# Patient Record
Sex: FEMALE | Race: WHITE
[De-identification: names, ages, dates, MRNs, and addresses within clinical notes are randomized per-mention and may not be internally consistent; named-entity substitution may affect disease eponyms.]

---
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
## 2018-05-18 ENCOUNTER — HOSPITAL ENCOUNTER (OUTPATIENT)
Dept: HOSPITAL 45 - C.LAB1850 | Age: 18
Discharge: HOME | End: 2018-05-18
Attending: INTERNAL MEDICINE
Payer: COMMERCIAL

## 2018-05-18 DIAGNOSIS — E03.9: Primary | ICD-10-CM

## 2023-04-26 ENCOUNTER — OFFICE VISIT (OUTPATIENT)
Dept: PRIMARY CARE | Facility: CLINIC | Age: 23
End: 2023-04-26
Payer: COMMERCIAL

## 2023-04-26 VITALS
HEART RATE: 83 BPM | BODY MASS INDEX: 35.51 KG/M2 | HEIGHT: 62 IN | OXYGEN SATURATION: 97 % | DIASTOLIC BLOOD PRESSURE: 84 MMHG | SYSTOLIC BLOOD PRESSURE: 131 MMHG | WEIGHT: 193 LBS

## 2023-04-26 DIAGNOSIS — Z00.00 WELL ADULT EXAM: Primary | ICD-10-CM

## 2023-04-26 DIAGNOSIS — F41.9 ANXIETY: ICD-10-CM

## 2023-04-26 DIAGNOSIS — E66.9 OBESITY (BMI 35.0-39.9 WITHOUT COMORBIDITY): ICD-10-CM

## 2023-04-26 PROBLEM — S62.655A CLOSED NONDISPLACED FRACTURE OF MIDDLE PHALANX OF LEFT RING FINGER: Status: RESOLVED | Noted: 2023-04-26 | Resolved: 2023-04-26

## 2023-04-26 PROCEDURE — 99385 PREV VISIT NEW AGE 18-39: CPT | Performed by: FAMILY MEDICINE

## 2023-04-26 PROCEDURE — 1036F TOBACCO NON-USER: CPT | Performed by: FAMILY MEDICINE

## 2023-04-26 RX ORDER — FLUOXETINE HYDROCHLORIDE 40 MG/1
1 CAPSULE ORAL DAILY
COMMUNITY
Start: 2023-02-04 | End: 2023-04-26 | Stop reason: SDUPTHER

## 2023-04-26 RX ORDER — FLUOXETINE HYDROCHLORIDE 40 MG/1
40 CAPSULE ORAL DAILY
Qty: 90 CAPSULE | Refills: 2 | Status: SHIPPED | OUTPATIENT
Start: 2023-04-26 | End: 2024-02-02

## 2023-04-26 ASSESSMENT — PATIENT HEALTH QUESTIONNAIRE - PHQ9
SUM OF ALL RESPONSES TO PHQ9 QUESTIONS 1 AND 2: 1
10. IF YOU CHECKED OFF ANY PROBLEMS, HOW DIFFICULT HAVE THESE PROBLEMS MADE IT FOR YOU TO DO YOUR WORK, TAKE CARE OF THINGS AT HOME, OR GET ALONG WITH OTHER PEOPLE: NOT DIFFICULT AT ALL
2. FEELING DOWN, DEPRESSED OR HOPELESS: NOT AT ALL
1. LITTLE INTEREST OR PLEASURE IN DOING THINGS: SEVERAL DAYS

## 2023-04-26 NOTE — PROGRESS NOTES
"  Subjective   Patient ID: Kayley Bender is a 22 y.o. adult who presents for Establish Care (New patient with anxiety, needs fluoxetine refill).  She moved here from [place] for a job a little over a year ago.  Her family is still in [place]    Past Medical, Surgical, Social and Family Hx reviewed-Yes    Any acute complaints?   No    Any chronic issues addressed today or Rx refills needed?    Anxiety feels well controlled on current prozac.  She wants to restart talk therapy.      Last pap has never had one done here immunizations  Labs unsure  Up to date on immunizations yes, TDaP age 17  Dentist in the past year has not found one yet    Menstruation no concerns  Sexual Activity no, never.  Unsure about sexual identity    Current health habits \"could be better.\"  She tries to eat healthy, drinks plenty of water.  No exercise.  She gets plenty of sleep but it could be better.    Hobbies: drawing, reading, D&D both local and online    PE:  /84   Pulse 83   Ht 1.562 m (5' 1.5\")   Wt 87.5 kg (193 lb)   LMP 03/26/2023   SpO2 97%   BMI 35.88 kg/m²   Alert adult woman, NAD  HEENT clear  Neck supple, No LAD  Heart RRR no murmur  Lungs CTA bilat  Abdomen benign, normal BS, soft NT/ND  Skin warm, dry, clear  Neuro grossly normal, gait WNL  Affect pleasant and appropriate, memory and judgement WNL        Assessment/Plan   Problem List Items Addressed This Visit          Other    Anxiety    Relevant Medications    FLUoxetine (PROzac) 40 mg capsule     Other Visit Diagnoses       Well adult exam    -  Primary    Obesity (BMI 35.0-39.9 without comorbidity)                   "

## 2024-02-02 DIAGNOSIS — F41.9 ANXIETY: ICD-10-CM

## 2024-02-02 RX ORDER — FLUOXETINE HYDROCHLORIDE 40 MG/1
40 CAPSULE ORAL DAILY
Qty: 90 CAPSULE | Refills: 0 | Status: SHIPPED | OUTPATIENT
Start: 2024-02-02

## 2024-06-27 ENCOUNTER — PATIENT MESSAGE (OUTPATIENT)
Dept: PRIMARY CARE | Facility: CLINIC | Age: 24
End: 2024-06-27
Payer: COMMERCIAL

## 2024-06-27 DIAGNOSIS — F41.9 ANXIETY: ICD-10-CM

## 2024-06-28 RX ORDER — FLUOXETINE HYDROCHLORIDE 40 MG/1
40 CAPSULE ORAL DAILY
Qty: 90 CAPSULE | Refills: 0 | Status: SHIPPED | OUTPATIENT
Start: 2024-06-28

## 2024-09-26 ENCOUNTER — APPOINTMENT (OUTPATIENT)
Dept: PRIMARY CARE | Facility: CLINIC | Age: 24
End: 2024-09-26
Payer: COMMERCIAL

## 2024-09-26 VITALS
WEIGHT: 194 LBS | BODY MASS INDEX: 36.63 KG/M2 | HEIGHT: 61 IN | SYSTOLIC BLOOD PRESSURE: 125 MMHG | DIASTOLIC BLOOD PRESSURE: 80 MMHG | OXYGEN SATURATION: 97 % | HEART RATE: 80 BPM

## 2024-09-26 DIAGNOSIS — Z12.4 SCREENING FOR CERVICAL CANCER: ICD-10-CM

## 2024-09-26 DIAGNOSIS — Z13.1 SCREENING FOR DIABETES MELLITUS: ICD-10-CM

## 2024-09-26 DIAGNOSIS — Z00.00 WELL ADULT EXAM: Primary | ICD-10-CM

## 2024-09-26 DIAGNOSIS — Z13.220 SCREENING FOR HYPERLIPIDEMIA: ICD-10-CM

## 2024-09-26 DIAGNOSIS — F41.9 ANXIETY: ICD-10-CM

## 2024-09-26 PROCEDURE — 3008F BODY MASS INDEX DOCD: CPT | Performed by: FAMILY MEDICINE

## 2024-09-26 PROCEDURE — 1036F TOBACCO NON-USER: CPT | Performed by: FAMILY MEDICINE

## 2024-09-26 PROCEDURE — 99395 PREV VISIT EST AGE 18-39: CPT | Performed by: FAMILY MEDICINE

## 2024-09-26 RX ORDER — FLUOXETINE HYDROCHLORIDE 40 MG/1
40 CAPSULE ORAL DAILY
Qty: 90 CAPSULE | Refills: 3 | Status: SHIPPED | OUTPATIENT
Start: 2024-09-26

## 2024-09-26 ASSESSMENT — PROMIS GLOBAL HEALTH SCALE
RATE_PHYSICAL_HEALTH: FAIR
RATE_MENTAL_HEALTH: GOOD
RATE_AVERAGE_FATIGUE: MODERATE
RATE_SOCIAL_SATISFACTION: FAIR
RATE_AVERAGE_PAIN: 1
RATE_QUALITY_OF_LIFE: GOOD
CARRYOUT_SOCIAL_ACTIVITIES: GOOD
EMOTIONAL_PROBLEMS: SOMETIMES
RATE_GENERAL_HEALTH: GOOD
CARRYOUT_PHYSICAL_ACTIVITIES: MOSTLY

## 2024-09-26 ASSESSMENT — PATIENT HEALTH QUESTIONNAIRE - PHQ9
2. FEELING DOWN, DEPRESSED OR HOPELESS: NOT AT ALL
1. LITTLE INTEREST OR PLEASURE IN DOING THINGS: NOT AT ALL
SUM OF ALL RESPONSES TO PHQ9 QUESTIONS 1 AND 2: 0

## 2024-09-26 NOTE — PROGRESS NOTES
"  Subjective   Patient ID: Kayley Bender is a 24 y.o. adult who presents for Annual Exam (Annual wellness wit pap).      Past Medical, Surgical, Social and Family Hx reviewed-Yes    Any acute complaints?    None     Any chronic issues addressed today or Rx refills needed?    Just needs a RF on her current dose, happy with the 40mg.      Last pap never  Last Mammogram N/a  Colon CA screening Hx N/A  Labs  Up to date on immunizations very unsure, advised to get records.    Dentist in the past year - has not been in 2 years-needs to try harder    Menstruation regular, not too heavy  Sexual Activity -never have been         PE:  /80   Pulse 80   Ht 1.556 m (5' 1.25\")   Wt 88 kg (194 lb)   LMP 08/06/2024 (Approximate)   SpO2 97%   BMI 36.36 kg/m²   Alert adult woman, NAD  HEENT clear  Neck supple, No LAD  Heart RRR no murmur  Lungs CTA bilat  Abdomen benign, normal BS, soft NT/ND  Skin warm, dry, clear  Neuro grossly normal, gait WNL  Affect pleasant and appropriate, memory and judgement WNL  Breasts WNL, no masses, axilla neg   normal ext female, no rash or lesions, cervix appears normal        Assessment/Plan   Assessment & Plan  Well adult exam  Reviewed HM and vaccines       Anxiety    Orders:    FLUoxetine (PROzac) 40 mg capsule; Take 1 capsule (40 mg) by mouth once daily.    Screening for hyperlipidemia    Orders:    Lipid Panel; Future    Screening for diabetes mellitus    Orders:    Hemoglobin A1C; Future    Screening for cervical cancer    Orders:    THINPREP PAP TEST           "

## 2024-10-10 LAB
CYTOLOGY CMNT CVX/VAG CYTO-IMP: NORMAL
LAB AP HPV GENOTYPE QUESTION: NO
LAB AP HPV HR: NORMAL
LABORATORY COMMENT REPORT: NORMAL
LMP START DATE: NORMAL
PATH REPORT.TOTAL CANCER: NORMAL